# Patient Record
Sex: FEMALE | Race: WHITE | NOT HISPANIC OR LATINO | Employment: OTHER | URBAN - METROPOLITAN AREA
[De-identification: names, ages, dates, MRNs, and addresses within clinical notes are randomized per-mention and may not be internally consistent; named-entity substitution may affect disease eponyms.]

---

## 2017-03-05 ENCOUNTER — APPOINTMENT (OUTPATIENT)
Dept: CT IMAGING | Facility: HOSPITAL | Age: 82
End: 2017-03-05

## 2017-03-05 ENCOUNTER — HOSPITAL ENCOUNTER (EMERGENCY)
Facility: HOSPITAL | Age: 82
Discharge: HOME | End: 2017-03-05
Attending: EMERGENCY MEDICINE | Admitting: EMERGENCY MEDICINE

## 2017-03-05 ENCOUNTER — APPOINTMENT (OUTPATIENT)
Dept: GENERAL RADIOLOGY | Facility: HOSPITAL | Age: 82
End: 2017-03-05

## 2017-03-05 VITALS
TEMPERATURE: 98.3 F | BODY MASS INDEX: 32.2 KG/M2 | HEIGHT: 62 IN | HEART RATE: 70 BPM | WEIGHT: 175 LBS | RESPIRATION RATE: 16 BRPM | DIASTOLIC BLOOD PRESSURE: 60 MMHG | OXYGEN SATURATION: 95 % | SYSTOLIC BLOOD PRESSURE: 112 MMHG

## 2017-03-05 DIAGNOSIS — D64.9 CHRONIC ANEMIA: ICD-10-CM

## 2017-03-05 DIAGNOSIS — S51.812A SKIN TEAR OF FOREARM WITHOUT COMPLICATION, LEFT, INITIAL ENCOUNTER: ICD-10-CM
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
## 2017-03-05 LAB
ANION GAP SERPL CALCULATED.3IONS-SCNC: 10.7 MMOL/L
BASOPHILS # BLD AUTO: 0.01 10*3/MM3 (ref 0–0.2)
BASOPHILS NFR BLD AUTO: 0.1 % (ref 0–1.5)
BUN BLD-MCNC: 24 MG/DL (ref 8–23)
BUN/CREAT SERPL: 24 (ref 7–25)
CALCIUM SPEC-SCNC: 8.5 MG/DL (ref 8.2–9.6)
CARBAMAZEPINE SERPL-MCNC: 2.4 MCG/ML (ref 8–12)
CHLORIDE SERPL-SCNC: 99 MMOL/L (ref 98–107)
CO2 SERPL-SCNC: 31.3 MMOL/L (ref 22–29)
CREAT BLD-MCNC: 1 MG/DL (ref 0.57–1)
DEPRECATED RDW RBC AUTO: 61.9 FL (ref 37–54)
EOSINOPHIL # BLD AUTO: 0.05 10*3/MM3 (ref 0–0.7)
EOSINOPHIL NFR BLD AUTO: 0.4 % (ref 0.3–6.2)
ERYTHROCYTE [DISTWIDTH] IN BLOOD BY AUTOMATED COUNT: 13.9 % (ref 11.7–13)
GFR SERPL CREATININE-BSD FRML MDRD: 52 ML/MIN/1.73
GLUCOSE BLD-MCNC: 90 MG/DL (ref 65–99)
HCT VFR BLD AUTO: 29.3 % (ref 35.6–45.5)
HGB BLD-MCNC: 9.4 G/DL (ref 11.9–15.5)
IMM GRANULOCYTES # BLD: 0.03 10*3/MM3 (ref 0–0.03)
IMM GRANULOCYTES NFR BLD: 0.3 % (ref 0–0.5)
LYMPHOCYTES # BLD AUTO: 1.66 10*3/MM3 (ref 0.9–4.8)
LYMPHOCYTES NFR BLD AUTO: 14.7 % (ref 19.6–45.3)
MACROCYTES BLD QL SMEAR: NORMAL
MCH RBC QN AUTO: 39.8 PG (ref 26.9–32)
MCHC RBC AUTO-ENTMCNC: 32.1 G/DL (ref 32.4–36.3)
MCV RBC AUTO: 124.2 FL (ref 80.5–98.2)
MONOCYTES # BLD AUTO: 0.61 10*3/MM3 (ref 0.2–1.2)
MONOCYTES NFR BLD AUTO: 5.4 % (ref 5–12)
NEUTROPHILS # BLD AUTO: 8.9 10*3/MM3 (ref 1.9–8.1)
NEUTROPHILS NFR BLD AUTO: 79.1 % (ref 42.7–76)
PLAT MORPH BLD: NORMAL
PLATELET # BLD AUTO: 193 10*3/MM3 (ref 140–500)
PMV BLD AUTO: 10.1 FL (ref 6–12)
POTASSIUM BLD-SCNC: 4.3 MMOL/L (ref 3.5–5.2)
RBC # BLD AUTO: 2.36 10*6/MM3 (ref 3.9–5.2)
SODIUM BLD-SCNC: 141 MMOL/L (ref 136–145)
STOMATOCYTES BLD QL SMEAR: NORMAL
WBC MORPH BLD: NORMAL
WBC NRBC COR # BLD: 11.26 10*3/MM3 (ref 4.5–10.7)

## 2017-03-05 PROCEDURE — 70450 CT HEAD/BRAIN W/O DYE: CPT

## 2017-03-05 PROCEDURE — 85007 BL SMEAR W/DIFF WBC COUNT: CPT | Performed by: EMERGENCY MEDICINE

## 2017-03-05 PROCEDURE — 73090 X-RAY EXAM OF FOREARM: CPT

## 2017-03-05 PROCEDURE — 85025 COMPLETE CBC W/AUTO DIFF WBC: CPT | Performed by: EMERGENCY MEDICINE

## 2017-03-05 PROCEDURE — 80048 BASIC METABOLIC PNL TOTAL CA: CPT | Performed by: EMERGENCY MEDICINE

## 2017-03-05 PROCEDURE — 99284 EMERGENCY DEPT VISIT MOD MDM: CPT

## 2017-03-05 PROCEDURE — 80156 ASSAY CARBAMAZEPINE TOTAL: CPT | Performed by: EMERGENCY MEDICINE

## 2017-03-05 RX ORDER — ACETAMINOPHEN 500 MG
500 TABLET ORAL EVERY 6 HOURS PRN
COMMUNITY

## 2017-03-05 RX ORDER — ALPRAZOLAM 0.25 MG/1
0.25 TABLET ORAL 3 TIMES DAILY PRN
COMMUNITY

## 2017-03-05 RX ORDER — SODIUM PHOSPHATE, DIBASIC AND SODIUM PHOSPHATE, MONOBASIC 7; 19 G/133ML; G/133ML
ENEMA RECTAL ONCE AS NEEDED
COMMUNITY

## 2017-03-05 RX ORDER — SENNOSIDES 8.6 MG
2 TABLET ORAL NIGHTLY
COMMUNITY

## 2017-03-05 RX ORDER — LANOLIN ALCOHOL/MO/W.PET/CERES
1000 CREAM (GRAM) TOPICAL WEEKLY
COMMUNITY

## 2017-03-05 RX ORDER — LEVETIRACETAM 100 MG/ML
10 SOLUTION ORAL 2 TIMES DAILY
COMMUNITY

## 2017-03-05 RX ORDER — BISACODYL 10 MG
10 SUPPOSITORY, RECTAL RECTAL AS NEEDED
COMMUNITY

## 2017-03-05 RX ORDER — CETIRIZINE HYDROCHLORIDE 10 MG/1
10 TABLET ORAL DAILY
COMMUNITY

## 2017-03-05 RX ORDER — FUROSEMIDE 20 MG/1
20 TABLET ORAL 2 TIMES DAILY
COMMUNITY

## 2017-03-05 RX ORDER — CARBAMAZEPINE 400 MG/1
400 TABLET, EXTENDED RELEASE ORAL DAILY
COMMUNITY

## 2017-03-05 RX ORDER — POTASSIUM CHLORIDE 750 MG/1
20 TABLET, EXTENDED RELEASE ORAL DAILY
COMMUNITY

## 2017-03-05 RX ORDER — LEVOTHYROXINE SODIUM 0.1 MG/1
100 TABLET ORAL DAILY
COMMUNITY

## 2017-03-05 RX ORDER — PHENOBARBITAL 64.8 MG/1
64.8 TABLET ORAL 2 TIMES DAILY
COMMUNITY

## 2017-03-05 RX ORDER — SIMETHICONE 80 MG
80 TABLET,CHEWABLE ORAL 3 TIMES DAILY
COMMUNITY

## 2017-03-05 RX ORDER — POLYETHYLENE GLYCOL 3350 17 G/17G
17 POWDER, FOR SOLUTION ORAL DAILY
COMMUNITY

## 2017-03-05 RX ORDER — CARBAMAZEPINE 200 MG/1
200 TABLET ORAL 2 TIMES DAILY
COMMUNITY

## 2017-03-05 NOTE — DISCHARGE INSTRUCTIONS
Follow-up with your doctor this week as needed.  Keep the wound on your left forearm covered for the next 2 days then wash it with warm soapy water once daily.  Return to emergency department for any problems.

## 2017-03-05 NOTE — ED PROVIDER NOTES
EMERGENCY DEPARTMENT ENCOUNTER    CHIEF COMPLAINT  Chief Complaint: Fall  History given by: Patient  History limited by: Dementia  Room Number: 31/31  PMD: Sang Herring MD      HPI:  Pt is a NH resident with h/o dementia who presents to the ED s/p fall today. Pt presumably sustained blow to head against the floor. Pt also injured her left forearm. Pt is unsure as to why she is in the ER and has no complaints at this time.     Location: Head, left forearm  Radiation: None  Quality: None  Intensity/Severity: Mild  Duration: Fall presumably occurred earlier today  Onset quality: Fall was abrupt  Timing: Injuries are constant  Progression: Unchanged  Aggravating Factors: Nothing  Alleviating Factors: Nothing  Previous Episodes: Unknown  Treatment before arrival: None  Associated Symptoms: None      PAST MEDICAL HISTORY  Active Ambulatory Problems     Diagnosis Date Noted   • No Active Ambulatory Problems     Resolved Ambulatory Problems     Diagnosis Date Noted   • No Resolved Ambulatory Problems     Past Medical History   Diagnosis Date   • Abnormal posture    • Anemia    • Congestive heart failure    • Constipated    • Constipation    • Convulsions    • Dementia    • Difficulty walking    • Disease of thyroid gland    • Dysphagia    • Edema    • Edema    • Esophagitis    • Fever    • Flatulence    • GERD (gastroesophageal reflux disease)    • Heart failure    • Hypopotassemia    • Hypotension    • Keratosis follicularis    • Pain    • UTI (urinary tract infection)    • Vitamin D deficiency          PAST SURGICAL HISTORY  History reviewed. No pertinent past surgical history.      FAMILY HISTORY  History reviewed. No pertinent family history.      SOCIAL HISTORY  Social History     Social History   • Marital status: Single     Spouse name: N/A   • Number of children: N/A   • Years of education: N/A     Occupational History   • Not on file.     Social History Main Topics   • Smoking status: Not on file   • Smokeless  tobacco: Not on file   • Alcohol use No   • Drug use: No   • Sexual activity: Defer     Other Topics Concern   • Not on file     Social History Narrative   • No narrative on file         ALLERGIES  Review of patient's allergies indicates no known allergies.        REVIEW OF SYSTEMS  Review of Systems   Unable to perform ROS: Dementia   Musculoskeletal:        Head injury, skin tear to the left forearm            PHYSICAL EXAM  ED Triage Vitals   Temp Heart Rate Resp BP SpO2   03/05/17 1521 03/05/17 1521 03/05/17 1521 03/05/17 1521 03/05/17 1521   98.3 °F (36.8 °C) 70 16 112/60 95 % WNL      Temp src Heart Rate Source Patient Position BP Location FiO2 (%)   -- -- -- -- --              Physical Exam   Constitutional: No distress.   HENT:   Head: Normocephalic and atraumatic.   Mouth/Throat: Mucous membranes are normal.   Eyes: EOM are normal. Pupils are equal, round, and reactive to light.   Neck: Normal range of motion. Neck supple.   Cardiovascular: Normal rate, regular rhythm and normal heart sounds.    Pulmonary/Chest: Effort normal and breath sounds normal. No respiratory distress. She has no decreased breath sounds. She has no wheezes. She has no rhonchi. She has no rales.   Abdominal: Soft. There is no tenderness. There is no rebound and no guarding.   Musculoskeletal: Normal range of motion.   Hips are nontender bilaterally.   Neurological: She is alert. She has normal sensation.   Skin: Skin is warm.   There is 8 x 3 cm skin tear to the ulnar aspect of the left forearm. No repair is indicated as the skin has been completely avulsed.    Psychiatric: Mood and affect normal.   Nursing note and vitals reviewed.          LAB RESULTS  Recent Results (from the past 24 hour(s))   Carbamazepine Level, Total    Collection Time: 03/05/17  4:14 PM   Result Value Ref Range    Carbamazepine Level 2.4 (L) 8.0 - 12.0 mcg/mL   Basic Metabolic Panel    Collection Time: 03/05/17  4:14 PM   Result Value Ref Range    Glucose 90  65 - 99 mg/dL    BUN 24 (H) 8 - 23 mg/dL    Creatinine 1.00 0.57 - 1.00 mg/dL    Sodium 141 136 - 145 mmol/L    Potassium 4.3 3.5 - 5.2 mmol/L    Chloride 99 98 - 107 mmol/L    CO2 31.3 (H) 22.0 - 29.0 mmol/L    Calcium 8.5 8.2 - 9.6 mg/dL    eGFR Non African Amer 52 (L) >60 mL/min/1.73    BUN/Creatinine Ratio 24.0 7.0 - 25.0    Anion Gap 10.7 mmol/L   CBC Auto Differential    Collection Time: 03/05/17  4:14 PM   Result Value Ref Range    WBC 11.26 (H) 4.50 - 10.70 10*3/mm3    RBC 2.36 (L) 3.90 - 5.20 10*6/mm3    Hemoglobin 9.4 (L) 11.9 - 15.5 g/dL    Hematocrit 29.3 (L) 35.6 - 45.5 %    .2 (H) 80.5 - 98.2 fL    MCH 39.8 (H) 26.9 - 32.0 pg    MCHC 32.1 (L) 32.4 - 36.3 g/dL    RDW 13.9 (H) 11.7 - 13.0 %    RDW-SD 61.9 (H) 37.0 - 54.0 fl    MPV 10.1 6.0 - 12.0 fL    Platelets 193 140 - 500 10*3/mm3    Neutrophil % 79.1 (H) 42.7 - 76.0 %    Lymphocyte % 14.7 (L) 19.6 - 45.3 %    Monocyte % 5.4 5.0 - 12.0 %    Eosinophil % 0.4 0.3 - 6.2 %    Basophil % 0.1 0.0 - 1.5 %    Immature Grans % 0.3 0.0 - 0.5 %    Neutrophils, Absolute 8.90 (H) 1.90 - 8.10 10*3/mm3    Lymphocytes, Absolute 1.66 0.90 - 4.80 10*3/mm3    Monocytes, Absolute 0.61 0.20 - 1.20 10*3/mm3    Eosinophils, Absolute 0.05 0.00 - 0.70 10*3/mm3    Basophils, Absolute 0.01 0.00 - 0.20 10*3/mm3    Immature Grans, Absolute 0.03 0.00 - 0.03 10*3/mm3   Scan Slide    Collection Time: 03/05/17  4:14 PM   Result Value Ref Range    Macrocytes Large/3+ None Seen    Stomatocytes Slight/1+ None Seen    WBC Morphology Normal Normal    Platelet Morphology Normal Normal       Ordered the above labs and reviewed the results.        RADIOLOGY  CT Head Without Contrast   There is nominal mucosal thickening within the left ethmoid  sinus, there is mucosal thickening and secretions within the sphenoid  sinus. Findings consistent with sinusitis. There is narrowing of both  external auditory canals. Temporal bones are satisfactory in appearance.  No skull  fracture.     There is diffuse cortical atrophy. No intracranial hemorrhage, gross  mass effect or edema. The ventricles are stable. Posterior fossa  demonstrates cerebellar atrophy. Interpreted by radiologist. Discussed with radiologist. Independently viewed by me.         XR Forearm 2 View Left - Negative acute. Interpreted by radiologist. Independently viewed by me.             Ordered the above noted radiological studies. Reviewed by me in PACS.       PROCEDURES  Procedures        PROGRESS AND CONSULTS  ED Course   Value Comment By Time   Hemoglobin: (!) 9.4 11.2 two years ago Ricky Kaufman MD 03/05 3264     3:54 PM: Blood work, left forearm X-Ray, and CT Head ordered for further evaluation.     5:09 PM: Pt's CT Head and left forearm X-Rays are negative acute. Left forearm wound was dressed. Pt will be discharged to the NH.              MEDICAL DECISION MAKING      MDM  Number of Diagnoses or Management Options  Chronic anemia:   Fall, initial encounter:   Skin tear of forearm without complication, left, initial encounter:   Diagnosis management comments: Patient poorly felt the nursing home today and hit her head.  She did not have any signs of trauma to her head.  She developed a skin tear of her left forearm but the skin was completely avulsed and there is nothing to repair.  Head CT showed no acute abnormalities.  X-rays of her left forearm were negative.  Patient was found to be anemic with a hemoglobin of 9.4.  Her carbamazepine level was low.  Patient will be discharged back to the nursing home.       Amount and/or Complexity of Data Reviewed  Clinical lab tests: ordered and reviewed (WBC is 11.26. )  Tests in the radiology section of CPT®: ordered and reviewed (CT Head: Secretions in the sphenoid sinuses, but no intracranial hemorrhage.   )  Discussion of test results with the performing providers: yes (CT Head results d/w radiologist.   )    Patient Progress  Patient progress: stable              DIAGNOSIS  Final diagnoses:   Fall, initial encounter   Skin tear of forearm without complication, left, initial encounter   Chronic anemia         DISPOSITION  Pt discharged.    DISCHARGE    Patient discharged in stable condition.    Reviewed implications of results, diagnosis, meds, responsibility to follow up, warning signs and symptoms of possible worsening, potential complications and reasons to return to ER.    Patient/Family voiced understanding of above instructions.    Discussed plan for discharge, as there is no emergent indication for admission.  Pt/family is agreeable and understands need for follow up and repeat testing.  Pt is aware that discharge does not mean that nothing is wrong but it indicates no emergency is present that requires admission and they must continue care with follow-up as given below or physician of their choice.     FOLLOW-UP  Sang Herring MD  [address] [address]  [phone]      As needed          Latest Documented Vital Signs:  As of 5:11 PM  BP- 112/60 HR- 70 Temp- 98.3 °F (36.8 °C) O2 sat- 95%        --  Documentation assistance provided by wilman Dewey for Dr. Mandeep MD.  Information recorded by the scribe was done at my direction and has been verified and validated by me.                 Alex Dewey  03/05/17 [id number]       Ricky Kaufman MD  03/05/17 [id number]

## 2017-03-05 NOTE — ED NOTES
Multiple falls in past few days. Pt hit head on floor today & has skin tear on left forearm.      Gabriella Rosenbaum RN  03/05/17 2088